# Patient Record
Sex: MALE | HISPANIC OR LATINO
[De-identification: names, ages, dates, MRNs, and addresses within clinical notes are randomized per-mention and may not be internally consistent; named-entity substitution may affect disease eponyms.]

---
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
## 2020-09-03 ENCOUNTER — OFFICE VISIT (OUTPATIENT)
Dept: FAMILY MEDICINE CLINIC | Facility: CLINIC | Age: 20
End: 2020-09-03
Payer: MEDICAID

## 2020-09-03 VITALS
SYSTOLIC BLOOD PRESSURE: 121 MMHG | BODY MASS INDEX: 17.68 KG/M2 | DIASTOLIC BLOOD PRESSURE: 81 MMHG | HEART RATE: 90 BPM | WEIGHT: 110 LBS | HEIGHT: 66 IN

## 2020-09-03 DIAGNOSIS — Z00.00 PHYSICAL EXAM: Primary | ICD-10-CM

## 2020-09-03 PROCEDURE — 3008F BODY MASS INDEX DOCD: CPT | Performed by: FAMILY MEDICINE

## 2020-09-03 PROCEDURE — 3074F SYST BP LT 130 MM HG: CPT | Performed by: FAMILY MEDICINE

## 2020-09-03 PROCEDURE — 99385 PREV VISIT NEW AGE 18-39: CPT | Performed by: FAMILY MEDICINE

## 2020-09-03 PROCEDURE — 3079F DIAST BP 80-89 MM HG: CPT | Performed by: FAMILY MEDICINE

## 2020-09-03 NOTE — PROGRESS NOTES
9/3/2020  1:36 PM    Pio Griffin is a 23year old male. Chief complaint(s): Patient presents with:  Routine Physical    HPI:     Pio Griffin primary complaint is regarding CPE.      Pio Griffin is a 23year old male who is here today for a  scho abdominal pain, constipation and blood in stool. Endocrine: Negative for polydipsia and polyuria. Genitourinary: Negative for hematuria and difficulty urinating. Musculoskeletal: Negative for back pain, joint swelling and joint pain.    Skin: Positive the right side and 2+ on the left side. Alert, pleasant male. No focal neurological dificits. Normal gait and coordination. Skin:   No rash   Psychiatric:   Appropriate affect and misdemeanor.        LABORATORY RESULTS:     EKG / Spirometry : -     Rad Orders This Visit:  Orders Placed This Encounter      CBC W Differential W Platelet [E]      Comp Metabolic Panel (14) [E]      Lipid Panel [E]      Urine Microscopic w Reflex CULTURE      Urinalysis, Routine [E]      TSH W Reflex To Free T4 [E]      Med

## 2020-09-17 ENCOUNTER — OFFICE VISIT (OUTPATIENT)
Dept: FAMILY MEDICINE CLINIC | Facility: CLINIC | Age: 20
End: 2020-09-17
Payer: MEDICAID

## 2020-09-17 VITALS
BODY MASS INDEX: 18 KG/M2 | HEIGHT: 66 IN | HEART RATE: 82 BPM | DIASTOLIC BLOOD PRESSURE: 68 MMHG | WEIGHT: 112 LBS | RESPIRATION RATE: 18 BRPM | TEMPERATURE: 97 F | SYSTOLIC BLOOD PRESSURE: 102 MMHG

## 2020-09-17 DIAGNOSIS — L70.0 ACNE VULGARIS: Primary | ICD-10-CM

## 2020-09-17 PROCEDURE — 3074F SYST BP LT 130 MM HG: CPT | Performed by: FAMILY MEDICINE

## 2020-09-17 PROCEDURE — 3078F DIAST BP <80 MM HG: CPT | Performed by: FAMILY MEDICINE

## 2020-09-17 PROCEDURE — 3008F BODY MASS INDEX DOCD: CPT | Performed by: FAMILY MEDICINE

## 2020-09-17 PROCEDURE — 99213 OFFICE O/P EST LOW 20 MIN: CPT | Performed by: FAMILY MEDICINE

## 2020-09-17 RX ORDER — CLINDAMYCIN PHOSPHATE 10 MG/G
1 GEL TOPICAL NIGHTLY
Qty: 60 G | Refills: 3 | Status: SHIPPED | OUTPATIENT
Start: 2020-09-17 | End: 2021-09-12

## 2020-09-17 RX ORDER — MINOCYCLINE HYDROCHLORIDE 100 MG/1
100 TABLET ORAL 2 TIMES DAILY
Qty: 60 TABLET | Refills: 3 | Status: SHIPPED | OUTPATIENT
Start: 2020-09-17 | End: 2022-01-25 | Stop reason: ALTCHOICE

## 2020-09-17 RX ORDER — BENZOYL PEROXIDE 2.5 G/100G
1 GEL TOPICAL DAILY
Qty: 60 G | Refills: 3 | Status: SHIPPED | OUTPATIENT
Start: 2020-09-17 | End: 2022-01-25 | Stop reason: ALTCHOICE

## 2020-09-17 NOTE — PROGRESS NOTES
9/17/2020  10:25 AM    Ish Harrell is a 23year old male. Chief complaint(s): Patient presents with:  Acne: facial, OTC proactive, dries skin    HPI:     Ish Harrell primary complaint is regarding acne.        Ish Harrell is a 23year old male w Psychiatric/Behavioral: Negative for depressed mood. PHYSICAL EXAM:   VS: Temp: 97.2 °F (36.2 °C)  Pulse: 82  Resp: 18  BP: 102/68   Physical Exam    Constitutional: He appears well-developed and well-nourished. HENT:   Head: Normocephalic.    Eye

## 2020-10-29 ENCOUNTER — LAB ENCOUNTER (OUTPATIENT)
Dept: LAB | Age: 20
End: 2020-10-29
Attending: FAMILY MEDICINE
Payer: MEDICAID

## 2020-10-29 DIAGNOSIS — Z00.00 PHYSICAL EXAM: ICD-10-CM

## 2020-10-29 PROCEDURE — 85025 COMPLETE CBC W/AUTO DIFF WBC: CPT

## 2020-10-29 PROCEDURE — 81001 URINALYSIS AUTO W/SCOPE: CPT | Performed by: FAMILY MEDICINE

## 2020-10-29 PROCEDURE — 80053 COMPREHEN METABOLIC PANEL: CPT

## 2020-10-29 PROCEDURE — 84443 ASSAY THYROID STIM HORMONE: CPT

## 2020-10-29 PROCEDURE — 36415 COLL VENOUS BLD VENIPUNCTURE: CPT

## 2020-10-29 PROCEDURE — 81015 MICROSCOPIC EXAM OF URINE: CPT | Performed by: FAMILY MEDICINE

## 2020-10-29 PROCEDURE — 80061 LIPID PANEL: CPT

## 2022-01-25 ENCOUNTER — OFFICE VISIT (OUTPATIENT)
Dept: FAMILY MEDICINE CLINIC | Facility: CLINIC | Age: 22
End: 2022-01-25
Payer: MEDICAID

## 2022-01-25 VITALS
HEIGHT: 66 IN | HEART RATE: 81 BPM | BODY MASS INDEX: 19.67 KG/M2 | WEIGHT: 122.38 LBS | SYSTOLIC BLOOD PRESSURE: 108 MMHG | DIASTOLIC BLOOD PRESSURE: 71 MMHG

## 2022-01-25 DIAGNOSIS — H53.8 BLURRED VISION: ICD-10-CM

## 2022-01-25 DIAGNOSIS — L70.0 ACNE VULGARIS: Primary | ICD-10-CM

## 2022-01-25 PROCEDURE — 3078F DIAST BP <80 MM HG: CPT | Performed by: FAMILY MEDICINE

## 2022-01-25 PROCEDURE — 3008F BODY MASS INDEX DOCD: CPT | Performed by: FAMILY MEDICINE

## 2022-01-25 PROCEDURE — 99213 OFFICE O/P EST LOW 20 MIN: CPT | Performed by: FAMILY MEDICINE

## 2022-01-25 PROCEDURE — 3074F SYST BP LT 130 MM HG: CPT | Performed by: FAMILY MEDICINE

## 2022-01-25 RX ORDER — CLINDAMYCIN AND BENZOYL PEROXIDE 10; 50 MG/G; MG/G
1 GEL TOPICAL NIGHTLY
Qty: 50 G | Refills: 3 | Status: SHIPPED | OUTPATIENT
Start: 2022-01-25 | End: 2023-01-20

## 2022-01-25 RX ORDER — MINOCYCLINE HYDROCHLORIDE 100 MG/1
100 CAPSULE ORAL 2 TIMES DAILY
Qty: 60 CAPSULE | Refills: 3 | Status: SHIPPED | OUTPATIENT
Start: 2022-01-25

## 2022-01-25 NOTE — PROGRESS NOTES
1/25/2022  2:42 PM    Jamie Garnett is a 24year old male.     Chief complaint(s): Patient presents with:  Acne: requesting referral to dermatologist  Vision Problem: blurry vision, cannot recall     HPI:     Jamie Garnett primary complaint is regarding a Pneumococcal (Prevnar 13)                          06/05/2001  10/01/2001  12/24/2001      TDAP                  04/16/2012      Varicella             04/16/2002  10/02/2009      Medications (Active prior to today's visit):  Current Outpatient Medications Disp Refills   • Clindamycin Phos-Benzoyl Perox 1-5 % External Gel 50 g 3     Sig: Apply 1 Application topically nightly. • minocycline (MINOCIN) 100 MG Oral Cap 60 capsule 3     Sig: Take 1 capsule (100 mg total) by mouth 2 (two) times daily.      Robbie Morin

## 2022-07-18 ENCOUNTER — OFFICE VISIT (OUTPATIENT)
Dept: FAMILY MEDICINE CLINIC | Facility: CLINIC | Age: 22
End: 2022-07-18
Payer: MEDICAID

## 2022-07-18 VITALS
WEIGHT: 120.38 LBS | SYSTOLIC BLOOD PRESSURE: 115 MMHG | DIASTOLIC BLOOD PRESSURE: 73 MMHG | HEART RATE: 79 BPM | BODY MASS INDEX: 19.35 KG/M2 | HEIGHT: 66 IN

## 2022-07-18 DIAGNOSIS — L63.9 ALOPECIA AREATA: ICD-10-CM

## 2022-07-18 DIAGNOSIS — Z00.00 PHYSICAL EXAM: Primary | ICD-10-CM

## 2022-07-18 PROCEDURE — 99395 PREV VISIT EST AGE 18-39: CPT | Performed by: FAMILY MEDICINE

## 2022-07-18 PROCEDURE — 3074F SYST BP LT 130 MM HG: CPT | Performed by: FAMILY MEDICINE

## 2022-07-18 PROCEDURE — 90471 IMMUNIZATION ADMIN: CPT | Performed by: FAMILY MEDICINE

## 2022-07-18 PROCEDURE — 3078F DIAST BP <80 MM HG: CPT | Performed by: FAMILY MEDICINE

## 2022-07-18 PROCEDURE — 90715 TDAP VACCINE 7 YRS/> IM: CPT | Performed by: FAMILY MEDICINE

## 2022-07-18 PROCEDURE — 3008F BODY MASS INDEX DOCD: CPT | Performed by: FAMILY MEDICINE

## 2022-07-18 RX ORDER — MINOXIDIL 5 %
1 FOAM (GRAM) TOPICAL 2 TIMES DAILY
Qty: 60 G | Refills: 3 | Status: SHIPPED | OUTPATIENT
Start: 2022-07-18

## 2022-10-05 ENCOUNTER — OFFICE VISIT (OUTPATIENT)
Dept: FAMILY MEDICINE CLINIC | Facility: CLINIC | Age: 22
End: 2022-10-05
Payer: MEDICAID

## 2022-10-05 VITALS
SYSTOLIC BLOOD PRESSURE: 107 MMHG | HEIGHT: 66 IN | DIASTOLIC BLOOD PRESSURE: 67 MMHG | BODY MASS INDEX: 19.32 KG/M2 | HEART RATE: 74 BPM | WEIGHT: 120.19 LBS

## 2022-10-05 DIAGNOSIS — L63.9 ALOPECIA AREATA: Primary | ICD-10-CM

## 2022-10-05 PROCEDURE — 3078F DIAST BP <80 MM HG: CPT | Performed by: FAMILY MEDICINE

## 2022-10-05 PROCEDURE — 3008F BODY MASS INDEX DOCD: CPT | Performed by: FAMILY MEDICINE

## 2022-10-05 PROCEDURE — 3074F SYST BP LT 130 MM HG: CPT | Performed by: FAMILY MEDICINE

## 2022-10-05 PROCEDURE — 99213 OFFICE O/P EST LOW 20 MIN: CPT | Performed by: FAMILY MEDICINE

## 2022-10-05 PROCEDURE — 96372 THER/PROPH/DIAG INJ SC/IM: CPT | Performed by: FAMILY MEDICINE

## 2022-10-05 RX ORDER — MINOXIDIL 5 %
1 FOAM (GRAM) TOPICAL 2 TIMES DAILY
Qty: 60 G | Refills: 3 | Status: SHIPPED | OUTPATIENT
Start: 2022-10-05

## 2022-10-05 RX ORDER — METHYLPREDNISOLONE ACETATE 80 MG/ML
80 INJECTION, SUSPENSION INTRA-ARTICULAR; INTRALESIONAL; INTRAMUSCULAR; SOFT TISSUE ONCE
Status: COMPLETED | OUTPATIENT
Start: 2022-10-05 | End: 2022-10-05

## 2022-10-05 RX ADMIN — METHYLPREDNISOLONE ACETATE 80 MG: 80 INJECTION, SUSPENSION INTRA-ARTICULAR; INTRALESIONAL; INTRAMUSCULAR; SOFT TISSUE at 09:28:00

## 2022-12-21 ENCOUNTER — LAB ENCOUNTER (OUTPATIENT)
Dept: LAB | Age: 22
End: 2022-12-21
Attending: FAMILY MEDICINE
Payer: MEDICAID

## 2022-12-21 LAB
ALBUMIN SERPL-MCNC: 4.8 G/DL (ref 3.4–5)
ALBUMIN/GLOB SERPL: 1.6 {RATIO} (ref 1–2)
ALP LIVER SERPL-CCNC: 84 U/L
ALT SERPL-CCNC: 18 U/L
ANION GAP SERPL CALC-SCNC: 5 MMOL/L (ref 0–18)
AST SERPL-CCNC: 15 U/L (ref 15–37)
BASOPHILS # BLD AUTO: 0.04 X10(3) UL (ref 0–0.2)
BASOPHILS NFR BLD AUTO: 0.6 %
BILIRUB SERPL-MCNC: 0.6 MG/DL (ref 0.1–2)
BILIRUB UR QL: NEGATIVE
BUN BLD-MCNC: 10 MG/DL (ref 7–18)
BUN/CREAT SERPL: 12.2 (ref 10–20)
CALCIUM BLD-MCNC: 10.1 MG/DL (ref 8.5–10.1)
CHLORIDE SERPL-SCNC: 103 MMOL/L (ref 98–112)
CHOLEST SERPL-MCNC: 153 MG/DL (ref ?–200)
CLARITY UR: CLEAR
CO2 SERPL-SCNC: 30 MMOL/L (ref 21–32)
COLOR UR: YELLOW
CREAT BLD-MCNC: 0.82 MG/DL
DEPRECATED RDW RBC AUTO: 41.6 FL (ref 35.1–46.3)
EOSINOPHIL # BLD AUTO: 0.18 X10(3) UL (ref 0–0.7)
EOSINOPHIL NFR BLD AUTO: 2.7 %
ERYTHROCYTE [DISTWIDTH] IN BLOOD BY AUTOMATED COUNT: 12 % (ref 11–15)
EST. AVERAGE GLUCOSE BLD GHB EST-MCNC: 105 MG/DL (ref 68–126)
FASTING PATIENT LIPID ANSWER: YES
FASTING STATUS PATIENT QL REPORTED: YES
GFR SERPLBLD BASED ON 1.73 SQ M-ARVRAT: 127 ML/MIN/1.73M2 (ref 60–?)
GLOBULIN PLAS-MCNC: 3 G/DL (ref 2.8–4.4)
GLUCOSE BLD-MCNC: 96 MG/DL (ref 70–99)
GLUCOSE UR-MCNC: NEGATIVE MG/DL
HBA1C MFR BLD: 5.3 % (ref ?–5.7)
HCT VFR BLD AUTO: 44.1 %
HDLC SERPL-MCNC: 44 MG/DL (ref 40–59)
HGB BLD-MCNC: 15.8 G/DL
HGB UR QL STRIP.AUTO: NEGATIVE
IMM GRANULOCYTES # BLD AUTO: 0.01 X10(3) UL (ref 0–1)
IMM GRANULOCYTES NFR BLD: 0.2 %
KETONES UR-MCNC: NEGATIVE MG/DL
LDLC SERPL CALC-MCNC: 87 MG/DL (ref ?–100)
LEUKOCYTE ESTERASE UR QL STRIP.AUTO: NEGATIVE
LYMPHOCYTES # BLD AUTO: 1.94 X10(3) UL (ref 1–4)
LYMPHOCYTES NFR BLD AUTO: 29.4 %
MCH RBC QN AUTO: 33.3 PG (ref 26–34)
MCHC RBC AUTO-ENTMCNC: 35.8 G/DL (ref 31–37)
MCV RBC AUTO: 93 FL
MONOCYTES # BLD AUTO: 0.43 X10(3) UL (ref 0.1–1)
MONOCYTES NFR BLD AUTO: 6.5 %
NEUTROPHILS # BLD AUTO: 3.99 X10 (3) UL (ref 1.5–7.7)
NEUTROPHILS # BLD AUTO: 3.99 X10(3) UL (ref 1.5–7.7)
NEUTROPHILS NFR BLD AUTO: 60.6 %
NITRITE UR QL STRIP.AUTO: NEGATIVE
NONHDLC SERPL-MCNC: 109 MG/DL (ref ?–130)
OSMOLALITY SERPL CALC.SUM OF ELEC: 285 MOSM/KG (ref 275–295)
PH UR: 7 [PH] (ref 5–8)
PLATELET # BLD AUTO: 245 10(3)UL (ref 150–450)
POTASSIUM SERPL-SCNC: 4.3 MMOL/L (ref 3.5–5.1)
PROT SERPL-MCNC: 7.8 G/DL (ref 6.4–8.2)
PROT UR-MCNC: NEGATIVE MG/DL
RBC # BLD AUTO: 4.74 X10(6)UL
SODIUM SERPL-SCNC: 138 MMOL/L (ref 136–145)
SP GR UR STRIP: 1.01 (ref 1–1.03)
TRIGL SERPL-MCNC: 123 MG/DL (ref 30–149)
TSI SER-ACNC: 2.35 MIU/ML (ref 0.36–3.74)
UROBILINOGEN UR STRIP-ACNC: <2
VIT C UR-MCNC: NEGATIVE MG/DL
VLDLC SERPL CALC-MCNC: 20 MG/DL (ref 0–30)
WBC # BLD AUTO: 6.6 X10(3) UL (ref 4–11)

## 2022-12-21 PROCEDURE — 83036 HEMOGLOBIN GLYCOSYLATED A1C: CPT | Performed by: FAMILY MEDICINE

## 2022-12-21 PROCEDURE — 84443 ASSAY THYROID STIM HORMONE: CPT | Performed by: FAMILY MEDICINE

## 2022-12-21 PROCEDURE — 81003 URINALYSIS AUTO W/O SCOPE: CPT | Performed by: FAMILY MEDICINE

## 2022-12-21 PROCEDURE — 85025 COMPLETE CBC W/AUTO DIFF WBC: CPT | Performed by: FAMILY MEDICINE

## 2022-12-21 PROCEDURE — 36415 COLL VENOUS BLD VENIPUNCTURE: CPT | Performed by: FAMILY MEDICINE

## 2022-12-21 PROCEDURE — 80053 COMPREHEN METABOLIC PANEL: CPT | Performed by: FAMILY MEDICINE

## 2022-12-21 PROCEDURE — 80061 LIPID PANEL: CPT | Performed by: FAMILY MEDICINE

## 2023-06-14 ENCOUNTER — OFFICE VISIT (OUTPATIENT)
Dept: FAMILY MEDICINE CLINIC | Facility: CLINIC | Age: 23
End: 2023-06-14

## 2023-06-14 VITALS
HEIGHT: 66 IN | DIASTOLIC BLOOD PRESSURE: 63 MMHG | WEIGHT: 129.19 LBS | BODY MASS INDEX: 20.76 KG/M2 | SYSTOLIC BLOOD PRESSURE: 99 MMHG | HEART RATE: 61 BPM

## 2023-06-14 DIAGNOSIS — H53.8 BLURRED VISION: ICD-10-CM

## 2023-06-14 DIAGNOSIS — L70.0 ACNE VULGARIS: ICD-10-CM

## 2023-06-14 DIAGNOSIS — L63.9 ALOPECIA AREATA: Primary | ICD-10-CM

## 2023-06-14 PROCEDURE — 3074F SYST BP LT 130 MM HG: CPT | Performed by: FAMILY MEDICINE

## 2023-06-14 PROCEDURE — 3078F DIAST BP <80 MM HG: CPT | Performed by: FAMILY MEDICINE

## 2023-06-14 PROCEDURE — 3008F BODY MASS INDEX DOCD: CPT | Performed by: FAMILY MEDICINE

## 2023-06-14 PROCEDURE — 99213 OFFICE O/P EST LOW 20 MIN: CPT | Performed by: FAMILY MEDICINE

## 2023-06-14 RX ORDER — MINOCYCLINE HYDROCHLORIDE 100 MG/1
100 CAPSULE ORAL 2 TIMES DAILY
Qty: 60 CAPSULE | Refills: 3 | Status: SHIPPED | OUTPATIENT
Start: 2023-06-14

## 2023-06-14 RX ORDER — CLINDAMYCIN PHOSPHATE 10 MG/G
1 GEL TOPICAL NIGHTLY
Qty: 60 G | Refills: 3 | Status: SHIPPED | OUTPATIENT
Start: 2023-06-14 | End: 2024-06-08

## 2023-09-20 ENCOUNTER — TELEPHONE (OUTPATIENT)
Dept: FAMILY MEDICINE CLINIC | Facility: CLINIC | Age: 23
End: 2023-09-20

## 2023-09-20 DIAGNOSIS — H53.8 BLURRED VISION: Primary | ICD-10-CM

## 2023-09-20 NOTE — TELEPHONE ENCOUNTER
Patient is requesting a new referral for Dr. Mikael Whitehead. Patient's referral from   before he was able to schedule an appointment. Patient has an appointment scheduled for tomorrow morning.     Patient's insurance does not typical require a referral. Patient was instructed by Dr. Sada Newton office to bring a referral.    Please call patient to pickup a copy of referral.

## 2024-08-22 ENCOUNTER — TELEPHONE (OUTPATIENT)
Dept: FAMILY MEDICINE CLINIC | Facility: CLINIC | Age: 24
End: 2024-08-22

## 2024-08-22 NOTE — TELEPHONE ENCOUNTER
Please call patient back, medications are not active on chart, what is he requesting?    Has not been seen since 6/2023    No labs are pended. (Most recent 12/2022)    Can address refills at appointment.  Future Appointments   Date Time Provider Department Center   9/11/2024  8:15 AM Vicente Galvan MD [facility]     (Has not used mychart since 12/2022)

## 2024-08-22 NOTE — TELEPHONE ENCOUNTER
Patient is requesting medication refills for    Tretinoin cream  Acne medication     [facility] - medication but does not have the name    [facility] in [facility] confirmed preferred

## 2024-09-11 ENCOUNTER — OFFICE VISIT (OUTPATIENT)
Dept: FAMILY MEDICINE CLINIC | Facility: CLINIC | Age: 24
End: 2024-09-11

## 2024-09-11 VITALS
HEIGHT: 66 IN | BODY MASS INDEX: 20.41 KG/M2 | HEART RATE: 71 BPM | WEIGHT: 127 LBS | DIASTOLIC BLOOD PRESSURE: 72 MMHG | SYSTOLIC BLOOD PRESSURE: 117 MMHG

## 2024-09-11 DIAGNOSIS — L70.0 ACNE VULGARIS: ICD-10-CM

## 2024-09-11 DIAGNOSIS — Z00.00 PHYSICAL EXAM: Primary | ICD-10-CM

## 2024-09-11 PROCEDURE — 99395 PREV VISIT EST AGE 18-39: CPT | Performed by: FAMILY MEDICINE

## 2024-09-11 RX ORDER — CLINDAMYCIN PHOSPHATE 10 MG/G
1 GEL TOPICAL NIGHTLY
Qty: 60 G | Refills: 3 | Status: SHIPPED | OUTPATIENT
Start: 2024-09-11 | End: 2025-09-06

## 2024-09-11 RX ORDER — BENZOYL PEROXIDE 2.5 G/100G
1 GEL TOPICAL DAILY
Qty: 60 G | Refills: 3 | Status: SHIPPED | OUTPATIENT
Start: 2024-09-11

## 2024-09-11 RX ORDER — MINOCYCLINE HYDROCHLORIDE 100 MG/1
100 CAPSULE ORAL 2 TIMES DAILY
Qty: 60 CAPSULE | Refills: 3 | Status: SHIPPED | OUTPATIENT
Start: 2024-09-11

## 2024-09-11 NOTE — PROGRESS NOTES
9/11/2024  8:37 AM    Magnus Avery is a 23 year old male.    Chief complaint(s):   Chief Complaint   Patient presents with    Routine Physical    Acne     HPI:     Magnus Avery primary complaint is regarding as above.     Magnus Avery is a 23 year old male is here for routine periodic health screening and examination.  His last physical exam was 2 years ago.  His last ECG was uncertain years ago and was normal. His last diabetes screening test was 2 years ago and was normal.   His last cholesterol test was 2 year ago and was normal.  Last dentist visit was 1months ago.  He is not current with his Td immunization 2022,.  He is not current with his Flu vaccination.  Patient last colonoscopy was none. He is not a smoker.     Magnus Avery is a 23 year old male who presents with acne. Symptoms have been present for > 5yrs. This rash has appeared before. Previous dermatologic condition include acne. The rash is located on the face.  Parent has tried nothing recently.  Itchiness and discomfort has been none. Patient does not have a fever. Recent illnesses: none. Sick contacts: none known. Possible contribution elements include diet.    HISTORY:  Past Medical History:    Hx of rhinoplasty      Past Surgical History:   Procedure Laterality Date    Tonsillectomy        No family history on file.   Social History:   Social History     Socioeconomic History    Marital status: Single   Tobacco Use    Smoking status: Never    Smokeless tobacco: Never   Substance and Sexual Activity    Alcohol use: Not Currently    Drug use: Not Currently        Immunizations:   Immunization History   Administered Date(s) Administered    DTAP 02/26/2001, 04/28/2001, 07/25/2001, 08/08/2002, 03/22/2005    HEP A,Ped/Adol,(2 Dose) 05/03/2008, 10/02/2009    HEP B, Ped/Adol 02/26/2001, 04/28/2001, 12/24/2001    HPV (Gardasil) 04/16/2012, 09/13/2012, 01/28/2013, 01/31/2017    IPV 02/26/2001, 04/28/2001, 12/24/2001, 03/22/2005    MMR 04/16/2002,  03/27/2006    Meningococcal (Menomune) 04/16/2012, 04/28/2017    Pneumococcal (Prevnar 13) 06/05/2001, 10/01/2001, 12/24/2001    TDAP 04/16/2012, 07/18/2022    Varicella 04/16/2002, 10/02/2009       Medications (Active prior to today's visit):  Current Outpatient Medications   Medication Sig Dispense Refill    minocycline (MINOCIN) 100 MG Oral Cap Take 1 capsule (100 mg total) by mouth 2 (two) times daily. 60 capsule 3    Benzoyl Peroxide 2.5 % External Gel Apply 1 Application  topically daily. 60 g 3    Clindamycin Phosphate 1 % External Gel Apply 1 Application  topically nightly. 60 g 3       Allergies:  No Known Allergies      ROS:   Review of Systems   Constitutional:  Negative for appetite change, fatigue and fever.   HENT:  Negative for hearing loss and nosebleeds.    Eyes:  Negative for pain and visual disturbance.   Respiratory:  Negative for apnea and shortness of breath.    Cardiovascular:  Negative for chest pain, palpitations and leg swelling.   Gastrointestinal:  Negative for abdominal pain, blood in stool, constipation, diarrhea, nausea and vomiting.   Endocrine: Negative for polydipsia and polyuria.   Genitourinary:  Negative for decreased urine volume, frequency and hematuria.        No nocturia   Musculoskeletal:  Negative for arthralgias.   Skin:  Negative for rash.        Facial acne   Neurological:  Negative for dizziness, syncope and headaches.   Psychiatric/Behavioral:  Negative for dysphoric mood and sleep disturbance.        PHYSICAL EXAM:   VS: /72 (BP Location: Right arm, Patient Position: Sitting, Cuff Size: adult)   Pulse 71   Ht 5' 6\" (1.676 m)   Wt 127 lb (57.6 kg)   BMI 20.50 kg/m²     Physical Exam  Vitals reviewed.   Constitutional:       Appearance: Normal appearance. He is well-developed.   HENT:      Head: Normocephalic.   Eyes:      General: No scleral icterus.     Conjunctiva/sclera: Conjunctivae normal.   Cardiovascular:      Rate and Rhythm: Normal rate.   Pulmonary:       Effort: Pulmonary effort is normal.   Musculoskeletal:      Cervical back: Neck supple.   Skin:     Findings: Rash present.      Comments: Facial closed comedones    Psychiatric:         Mood and Affect: Mood normal.         LABORATORY RESULTS:     EKG / Spirometry : -     Radiology: No results found.     ASSESSMENT/PLAN:   Assessment   Encounter Diagnoses   Name Primary?    Physical exam Yes    Acne vulgaris        1. Physical exam      CPE PLAN:    LABS / TEST & ORDERS for today's visit :Blood test(s) ordered today for send out to [facility] lab:  Orders Placed This Encounter   Procedures    CBC With Differential With Platelet    Comp Metabolic Panel (14)    Hemoglobin A1C    Lipid Panel    TSH W Reflex To Free T4    Vitamin D    Urinalysis with Culture Reflex     In-House; Urine dip.  Test/Procedures done today include: EKG.    IMMUNIZATIONS: none given today.    RECOMMENDATIONS given include: ANTICIPATORY GUIDANCE  topics covered today include: safety (i.e. seat belts, helmets, sunscreen, protective sports gear ), nutrition (i.e. healthy meals and snacks (i.e. avoid junk food and high-carbohydrate foods); athletic conditioning, fluids; low fat milk, limit to less than 20 oz. a day; dental care ), and Healthy habits& Social competence & Responsibilities: Recommendations on physical activity; exercise daily or at least 3 times a week for 30-60 minutes doing cardiovascular exercise. Encourage to maintain the best physical and dental hygiene possible.  Consider a  if overweight and/or having difficult in staying active; attempt to keep a schedule that includes an adequate sleep and physical exercise / activities Patient educated on doing regular self testicular exam. Patient was educated on sexual transmitted disease. Best to abstain from sexual intercourse until he is ready to form a family. Use of condoms may prevent transmission of infections as well as pregnancy.    FOLLOW-UP:  Schedule a follow-up visit in 12 months.       2. Acne vulgaris    MEDICATIONS:     Requested Prescriptions     Signed Prescriptions Disp Refills    minocycline (MINOCIN) 100 MG Oral Cap 60 capsule 3     Sig: Take 1 capsule (100 mg total) by mouth 2 (two) times daily.    Benzoyl Peroxide 2.5 % External Gel 60 g 3     Sig: Apply 1 Application  topically daily.    Clindamycin Phosphate 1 % External Gel 60 g 3     Sig: Apply 1 Application  topically nightly.     RECOMMENDATIONS given include: Patient was reassured of  his medical condition and all questions and concerns were answered. Patient was informed to please, call our office with any new or further questions or concerns that may come up in the near future. Notify Dr Sheehan or the [facility] if there is a deterioration or worsening of the medical condition. Also, inform the doctor with any new symptoms or medications' side effects.      FOLLOW-UP: Schedule a follow-up visit in  3 months.              Orders This Visit:  Orders Placed This Encounter   Procedures    CBC With Differential With Platelet    Comp Metabolic Panel (14)    Hemoglobin A1C    Lipid Panel    TSH W Reflex To Free T4    Vitamin D    Urinalysis with Culture Reflex       Meds This Visit:  Requested Prescriptions     Signed Prescriptions Disp Refills    minocycline (MINOCIN) 100 MG Oral Cap 60 capsule 3     Sig: Take 1 capsule (100 mg total) by mouth 2 (two) times daily.    Benzoyl Peroxide 2.5 % External Gel 60 g 3     Sig: Apply 1 Application  topically daily.    Clindamycin Phosphate 1 % External Gel 60 g 3     Sig: Apply 1 Application  topically nightly.       Imaging & Referrals:  None         GENESIS SHEEHAN MD

## 2024-10-08 ENCOUNTER — OFFICE VISIT (OUTPATIENT)
Dept: FAMILY MEDICINE CLINIC | Facility: CLINIC | Age: 24
End: 2024-10-08

## 2024-10-08 ENCOUNTER — NURSE TRIAGE (OUTPATIENT)
Dept: FAMILY MEDICINE CLINIC | Facility: CLINIC | Age: 24
End: 2024-10-08

## 2024-10-08 VITALS
HEIGHT: 66 IN | HEART RATE: 77 BPM | DIASTOLIC BLOOD PRESSURE: 80 MMHG | BODY MASS INDEX: 20.89 KG/M2 | SYSTOLIC BLOOD PRESSURE: 121 MMHG | TEMPERATURE: 97 F | WEIGHT: 130 LBS

## 2024-10-08 DIAGNOSIS — R59.9 SWOLLEN LYMPH NODES: Primary | ICD-10-CM

## 2024-10-08 DIAGNOSIS — L29.9 GENERALIZED PRURITUS: ICD-10-CM

## 2024-10-08 DIAGNOSIS — R22.0 LIP SWELLING: ICD-10-CM

## 2024-10-08 DIAGNOSIS — H10.12 ALLERGIC CONJUNCTIVITIS OF LEFT EYE: ICD-10-CM

## 2024-10-08 DIAGNOSIS — J02.9 ACUTE PHARYNGITIS, UNSPECIFIED ETIOLOGY: ICD-10-CM

## 2024-10-08 DIAGNOSIS — H02.846 SWOLLEN EYELID, LEFT: ICD-10-CM

## 2024-10-08 LAB
KIT LOT #: 7282 NUMERIC
STREP GRP A CUL-SCR: NEGATIVE

## 2024-10-08 PROCEDURE — 99214 OFFICE O/P EST MOD 30 MIN: CPT | Performed by: FAMILY MEDICINE

## 2024-10-08 PROCEDURE — 87880 STREP A ASSAY W/OPTIC: CPT | Performed by: FAMILY MEDICINE

## 2024-10-08 RX ORDER — PREDNISONE 20 MG/1
TABLET ORAL
Qty: 10 TABLET | Refills: 0 | Status: SHIPPED | OUTPATIENT
Start: 2024-10-08

## 2024-10-08 NOTE — PATIENT INSTRUCTIONS
Take 2 tablets of the prednisone daily at lunch with some food and then you can do the Augmentin once in the morning and once at dinner.  The Augmentin is the antibiotic and the prednisone is a steroid.  If no better after finishing off medicines or of course if it gets worse please let me or Dr. Galvan know as you will need to see an ENT doctor.

## 2024-10-08 NOTE — PROGRESS NOTES
Patient ID: Magnus Avery is a 23 year old male.    HPI  Chief Complaint   Patient presents with    Swelling     left neck lymph nodes       He states for about 1 week now his lips were a bit swollen.  He remembers kissing a girl that he knows on September 28, 2024.  He states he has no shortness of breath or fevers.  He had no tongue swelling.  He states his lips are pretty much normal today.  He states his left upper and lower eyelid swelled up quite a bit this morning and he had a hard time seeing out of it but the swelling has decreased.  He is got some tearing out of the eye and minimal itching but no purulent discharge.    He states he just noticed the lymph node swelling this morning underneath the chin on the left side    Wt Readings from Last 6 Encounters:   10/08/24 130 lb (59 kg)   09/11/24 127 lb (57.6 kg)   06/14/23 129 lb 3.2 oz (58.6 kg)   10/05/22 120 lb 3.2 oz (54.5 kg)   07/18/22 120 lb 6.4 oz (54.6 kg)   01/25/22 122 lb 6.4 oz (55.5 kg)       BMI Readings from Last 6 Encounters:   10/08/24 20.98 kg/m²   09/11/24 20.50 kg/m²   06/14/23 20.85 kg/m²   10/05/22 19.40 kg/m²   07/18/22 19.43 kg/m²   01/25/22 19.76 kg/m²       BP Readings from Last 6 Encounters:   10/08/24 121/80   09/11/24 117/72   06/14/23 99/63   10/05/22 107/67   07/18/22 115/73   01/25/22 108/71         Review of Systems   Constitutional:  Negative for fever.   HENT:  Positive for sore throat (very minimal). Negative for congestion, postnasal drip, rhinorrhea and sneezing.    Respiratory:  Negative for cough and shortness of breath.    Cardiovascular:  Negative for chest pain.   Skin:  Negative for rash.   Neurological:  Negative for dizziness.           Medical History:      Past Medical History:    Hx of rhinoplasty       Past Surgical History:   Procedure Laterality Date    Tonsillectomy            Current Outpatient Medications   Medication Sig Dispense Refill           Benzoyl Peroxide 2.5 % External Gel Apply 1 Application   topically daily. 60 g 3    Clindamycin Phosphate 1 % External Gel Apply 1 Application  topically nightly. 60 g 3     Allergies:No Known Allergies     Physical Exam:       Physical Exam  Blood pressure 121/80, pulse 77, temperature 97 °F (36.1 °C), temperature source Temporal, height 5' 6\" (1.676 m), weight 130 lb (59 kg).  Physical Exam   Constitutional: Patient is oriented to person, place, and time. Patient appears well-developed and well-nourished. No distress.   HENT:   Head: Normocephalic.   Right Ear: Tympanic membrane, external ear and ear canal normal.   Left Ear: Tympanic membrane, external ear and ear canal normal.   Nose: No mucosal edema or rhinorrhea.   Left upper and lower eyelid are slightly puffy but not red.  There is no purulent discharge.  Bilateral sclera are slightly hyperemic.  THROAT: Oropharynx does show quite a bit of erythema in the posterior pharynx but no exudate and tonsils were removed.  Mouth is not swollen.  Tongue and lips are not swollen.  Eyes: Conjunctivae and EOM are normal.   Neck: Normal range of motion. No thyromegaly present.   Cardiovascular: Normal rate, regular rhythm and normal heart sounds.    Pulmonary/Chest: Effort normal and breath sounds normal. No respiratory distress.  Speaking clearly  Lymphadenopathy:     Under his chin he has a submental lymph node on the left that is slightly larger than 1 cm.  It is much more visible when he extends his neck.  Is not tender.  He has no submandibular lymphadenopathy.  Neurological: Patient is alert and oriented to person, place, and time.   Skin: Skin is warm.  No rash seen.  Psychiatry: Normal mood and affect     Vitals reviewed.           Assessment/Plan:      Diagnoses and all orders for this visit:    Swollen lymph nodes  -     POC Rapid Strep [43520]  -     amoxicillin clavulanate 875-125 MG Oral Tab; Take 1 tablet by mouth 2 (two) times daily for 10 days.  -     predniSONE 20 MG Oral Tab; Take 2 by mouth at same time  daily for 5 days. (Best taken at BREAKFAST or LUNCH)  Rapid strep test was negative.  He is self-pay and we are going to hold on doing any type of blood work or sending his strep test for a culture due to the expense.  He would rather try medications and if it does not get better he would see ENT.  Okay to place him on the above 2 medications.  He is not in any distress.  He agrees with plan.  Lip swelling  -     predniSONE 20 MG Oral Tab; Take 2 by mouth at same time daily for 5 days. (Best taken at BREAKFAST or LUNCH)    Acute pharyngitis, unspecified etiology  -     amoxicillin clavulanate 875-125 MG Oral Tab; Take 1 tablet by mouth 2 (two) times daily for 10 days.  -     predniSONE 20 MG Oral Tab; Take 2 by mouth at same time daily for 5 days. (Best taken at BREAKFAST or LUNCH)    Allergic conjunctivitis of left eye  -     predniSONE 20 MG Oral Tab; Take 2 by mouth at same time daily for 5 days. (Best taken at BREAKFAST or LUNCH)    Swollen eyelid, left  -     predniSONE 20 MG Oral Tab; Take 2 by mouth at same time daily for 5 days. (Best taken at BREAKFAST or LUNCH)  Mild swelling.  Generalized pruritus  -     predniSONE 20 MG Oral Tab; Take 2 by mouth at same time daily for 5 days. (Best taken at BREAKFAST or LUNCH)  The pruritus is not severe and seems to come and go.      Referrals (if applicable)  No orders of the defined types were placed in this encounter.        Follow up if symptoms persist.  Take medicine (if given) as prescribed.  Approach to treatment discussed and patient/family member understands and agrees to plan.     No follow-ups on file.        Teja Magdaleno DO  10/8/2024

## 2024-10-08 NOTE — TELEPHONE ENCOUNTER
Action Requested: Summary for Provider     []  Critical Lab, Recommendations Needed  [] Need Additional Advice  []   FYI    []   Need Orders  [] Need Medications Sent to Pharmacy  []  Other     SUMMARY: Left  neck lymph node swollen neck started yesterday and getting bigger in size, soft, no fever, left eye , lips and mild nasal congestion x 1 week, left eye could not see in the morning but all symptoms gone as soon as he gets up . Took benadryl .           Future Appointments   Date Time Provider Department Center   10/8/2024  9:30 AM Teja Magdaleno DO [facility]                Reason for call: Lymph Node  Onset: 2 days             Reason for Disposition   Patient wants to be seen    Protocols used: Lymph Nodes - [id number]

## (undated) NOTE — LETTER
Date & Time: 5/1/2018, 4:19 PM  Patient: Ponce Kennedy  Encounter Provider(s):    Esme Garcia MD       To Whom It May Concern:    Ponce Kennedy was seen and treated in our department on 5/1/2018.  He should not participate in gym/sports until 2